# Patient Record
Sex: FEMALE
[De-identification: names, ages, dates, MRNs, and addresses within clinical notes are randomized per-mention and may not be internally consistent; named-entity substitution may affect disease eponyms.]

---

## 2021-01-26 ENCOUNTER — APPOINTMENT (OUTPATIENT)
Dept: DERMATOLOGY | Facility: CLINIC | Age: 56
End: 2021-01-26
Payer: MEDICAID

## 2021-01-26 PROCEDURE — 99204 OFFICE O/P NEW MOD 45 MIN: CPT | Mod: 25

## 2021-01-26 PROCEDURE — 99072 ADDL SUPL MATRL&STAF TM PHE: CPT

## 2021-01-26 PROCEDURE — 11900 INJECT SKIN LESIONS </W 7: CPT

## 2022-01-04 ENCOUNTER — TRANSCRIPTION ENCOUNTER (OUTPATIENT)
Age: 57
End: 2022-01-04

## 2022-11-09 ENCOUNTER — OFFICE (OUTPATIENT)
Dept: URBAN - METROPOLITAN AREA CLINIC 94 | Facility: CLINIC | Age: 57
Setting detail: OPHTHALMOLOGY
End: 2022-11-09
Payer: MEDICAID

## 2022-11-09 DIAGNOSIS — H35.033: ICD-10-CM

## 2022-11-09 DIAGNOSIS — H16.223: ICD-10-CM

## 2022-11-09 DIAGNOSIS — E11.9: ICD-10-CM

## 2022-11-09 DIAGNOSIS — H25.13: ICD-10-CM

## 2022-11-09 PROCEDURE — 92014 COMPRE OPH EXAM EST PT 1/>: CPT | Performed by: OPHTHALMOLOGY

## 2022-11-09 PROCEDURE — 92250 FUNDUS PHOTOGRAPHY W/I&R: CPT | Performed by: OPHTHALMOLOGY

## 2022-11-09 ASSESSMENT — KERATOMETRY
METHOD_AUTO_MANUAL: AUTO
OD_K1POWER_DIOPTERS: 45.50
OS_AXISANGLE_DEGREES: 104
OS_K2POWER_DIOPTERS: 46.00
OD_K2POWER_DIOPTERS: 46.00
OD_AXISANGLE_DEGREES: 084
OS_K1POWER_DIOPTERS: 45.50

## 2022-11-09 ASSESSMENT — AXIALLENGTH_DERIVED
OD_AL: 22.4796
OS_AL: 22.5684
OS_AL: 22.7934

## 2022-11-09 ASSESSMENT — REFRACTION_AUTOREFRACTION
OS_SPHERE: +0.75
OS_AXIS: 152
OD_CYLINDER: -0.25
OS_CYLINDER: -0.25
OD_SPHERE: +1.00
OD_AXIS: 115

## 2022-11-09 ASSESSMENT — VISUAL ACUITY
OD_BCVA: 20/25-
OS_BCVA: 20/30

## 2022-11-09 ASSESSMENT — REFRACTION_MANIFEST
OD_VA1: 20/20
OS_CYLINDER: -0.50
OS_VA1: 20/20
OD_SPHERE: PL
OS_SPHERE: +0.25
OD_ADD: +1.50
OS_AXIS: 180
OS_ADD: +1.50

## 2022-11-09 ASSESSMENT — SUPERFICIAL PUNCTATE KERATITIS (SPK)
OS_SPK: T 1+
OD_SPK: T 1+

## 2022-11-09 ASSESSMENT — SPHEQUIV_DERIVED
OS_SPHEQUIV: 0
OD_SPHEQUIV: 0.875
OS_SPHEQUIV: 0.625

## 2022-11-09 ASSESSMENT — TONOMETRY
OS_IOP_MMHG: 16
OD_IOP_MMHG: 14

## 2022-12-07 PROBLEM — H25.13 CATARACT SENILE NUCLEAR SCLEROSIS; BOTH EYES: Status: ACTIVE | Noted: 2022-11-09

## 2022-12-07 PROBLEM — E11.9 DIABETES TYPE 2 NO RETINOPATHY; RIGHT MILD WITHOUT ME, LEFT MILD WITHOUT ME: Status: ACTIVE | Noted: 2022-11-09

## 2022-12-07 PROBLEM — H16.223 DRY EYE SYNDROME K SICCA; BOTH EYES: Status: ACTIVE | Noted: 2022-11-09

## 2023-01-19 ENCOUNTER — OFFICE (OUTPATIENT)
Dept: URBAN - METROPOLITAN AREA CLINIC 94 | Facility: CLINIC | Age: 58
Setting detail: OPHTHALMOLOGY
End: 2023-01-19
Payer: MEDICAID

## 2023-01-19 DIAGNOSIS — H01.002: ICD-10-CM

## 2023-01-19 DIAGNOSIS — E11.9: ICD-10-CM

## 2023-01-19 DIAGNOSIS — H25.12: ICD-10-CM

## 2023-01-19 DIAGNOSIS — H35.033: ICD-10-CM

## 2023-01-19 DIAGNOSIS — H25.13: ICD-10-CM

## 2023-01-19 PROBLEM — H25.11 CATARACT SENILE NUCLEAR SCLEROSIS; RIGHT EYE, LEFT EYE, BOTH EYES: Status: ACTIVE | Noted: 2023-01-19

## 2023-01-19 PROBLEM — H01.005 BLEPHARITIS; RIGHT LOWER LID, LEFT LOWER LID: Status: ACTIVE | Noted: 2023-01-19

## 2023-01-19 PROCEDURE — 99214 OFFICE O/P EST MOD 30 MIN: CPT | Performed by: OPHTHALMOLOGY

## 2023-01-19 PROCEDURE — 92136 OPHTHALMIC BIOMETRY: CPT | Performed by: OPHTHALMOLOGY

## 2023-01-19 ASSESSMENT — REFRACTION_MANIFEST
OS_SPHERE: +1.00
OS_ADD: +1.50
OS_CYLINDER: -0.25
OD_AXIS: 090
OD_SPHERE: +1.00
OD_VA1: 20/60
OS_AXIS: 140
OS_VA1: 20/40
OD_CYLINDER: -0.25
OD_ADD: +1.50

## 2023-01-19 ASSESSMENT — AXIALLENGTH_DERIVED
OD_AL: 22.4796
OS_AL: 22.4796
OD_AL: 22.4796
OS_AL: 22.4796

## 2023-01-19 ASSESSMENT — KERATOMETRY
OD_CYLPOWER_DEGREES: 0.5
OS_CYLPOWER_DEGREES: 0.5
OS_AXISANGLE_DEGREES: 099
OD_K1POWER_DIOPTERS: 45.50
OD_CYLAXISANGLE_DEGREES: 74
OS_K2POWER_DIOPTERS: 46.00
OD_K1POWER_DIOPTERS: 45.50
OD_K1K2_AVERAGE: 45.75
OD_K2POWER_DIOPTERS: 46.00
OS_K1K2_AVERAGE: 45.75
OS_K2POWER_DIOPTERS: 46.00
OS_K1POWER_DIOPTERS: 45.50
OS_AXISANGLE_DEGREES: 099
OS_AXISANGLE2_DEGREES: 099
OD_AXISANGLE2_DEGREES: 074
OD_AXISANGLE_DEGREES: 074
OD_K2POWER_DIOPTERS: 46.00
METHOD_AUTO_MANUAL: AUTO
OD_AXISANGLE_DEGREES: 074
OS_CYLAXISANGLE_DEGREES: 99
OS_K1POWER_DIOPTERS: 45.50

## 2023-01-19 ASSESSMENT — REFRACTION_AUTOREFRACTION
OD_CYLINDER: -0.25
OS_AXIS: 139
OD_AXIS: 092
OD_SPHERE: +1.00
OS_CYLINDER: -0.25
OS_SPHERE: +1.00

## 2023-01-19 ASSESSMENT — VISUAL ACUITY
OS_BCVA: 20/60
OD_BCVA: 20/40

## 2023-01-19 ASSESSMENT — SPHEQUIV_DERIVED
OD_SPHEQUIV: 0.875
OS_SPHEQUIV: 0.875
OD_SPHEQUIV: 0.875
OS_SPHEQUIV: 0.875

## 2023-01-19 ASSESSMENT — CONFRONTATIONAL VISUAL FIELD TEST (CVF)
OD_FINDINGS: FULL
OS_FINDINGS: FULL

## 2023-01-19 ASSESSMENT — TONOMETRY
OD_IOP_MMHG: 16
OS_IOP_MMHG: 17

## 2023-01-19 ASSESSMENT — LID EXAM ASSESSMENTS
OS_BLEPHARITIS: LLL 1+
OD_BLEPHARITIS: RLL 1+

## 2023-01-19 ASSESSMENT — SUPERFICIAL PUNCTATE KERATITIS (SPK)
OD_SPK: T 1+
OS_SPK: T 1+

## 2023-02-13 ENCOUNTER — OFFICE (OUTPATIENT)
Dept: URBAN - METROPOLITAN AREA CLINIC 94 | Facility: CLINIC | Age: 58
Setting detail: OPHTHALMOLOGY
End: 2023-02-13
Payer: MEDICAID

## 2023-02-13 DIAGNOSIS — Z20.822: ICD-10-CM

## 2023-02-13 DIAGNOSIS — Z01.812: ICD-10-CM

## 2023-02-13 PROCEDURE — 99211 OFF/OP EST MAY X REQ PHY/QHP: CPT | Performed by: OPHTHALMOLOGY

## 2023-02-14 ASSESSMENT — REFRACTION_MANIFEST
OS_ADD: +1.50
OD_VA1: 20/60
OS_AXIS: 140
OD_ADD: +1.50
OD_SPHERE: +1.00
OS_CYLINDER: -0.25
OS_VA1: 20/40
OD_AXIS: 090
OS_SPHERE: +1.00
OD_CYLINDER: -0.25

## 2023-02-14 ASSESSMENT — KERATOMETRY
OS_AXISANGLE_DEGREES: 099
OD_AXISANGLE_DEGREES: 074
OS_K1POWER_DIOPTERS: 45.50
OD_K2POWER_DIOPTERS: 46.00
METHOD_AUTO_MANUAL: AUTO
OD_K1POWER_DIOPTERS: 45.50
OS_K2POWER_DIOPTERS: 46.00

## 2023-02-14 ASSESSMENT — SUPERFICIAL PUNCTATE KERATITIS (SPK)
OS_SPK: T 1+
OD_SPK: T 1+

## 2023-02-14 ASSESSMENT — SPHEQUIV_DERIVED
OD_SPHEQUIV: 0.875
OD_SPHEQUIV: 0.875
OS_SPHEQUIV: 0.875
OS_SPHEQUIV: 0.875

## 2023-02-14 ASSESSMENT — AXIALLENGTH_DERIVED
OS_AL: 22.4796
OS_AL: 22.4796
OD_AL: 22.4796
OD_AL: 22.4796

## 2023-02-14 ASSESSMENT — REFRACTION_AUTOREFRACTION
OD_AXIS: 092
OD_CYLINDER: -0.25
OS_SPHERE: +1.00
OS_AXIS: 139
OD_SPHERE: +1.00
OS_CYLINDER: -0.25

## 2023-02-14 ASSESSMENT — VISUAL ACUITY
OD_BCVA: 20/40
OS_BCVA: 20/60

## 2023-02-16 ENCOUNTER — ASC (OUTPATIENT)
Dept: URBAN - METROPOLITAN AREA SURGERY 8 | Facility: SURGERY | Age: 58
Setting detail: OPHTHALMOLOGY
End: 2023-02-16
Payer: MEDICAID

## 2023-02-16 DIAGNOSIS — H25.12: ICD-10-CM

## 2023-02-16 DIAGNOSIS — H52.212: ICD-10-CM

## 2023-02-16 PROCEDURE — FEMTO CATARACT LASER: Performed by: OPHTHALMOLOGY

## 2023-02-16 PROCEDURE — 66984 XCAPSL CTRC RMVL W/O ECP: CPT | Performed by: OPHTHALMOLOGY

## 2023-02-16 PROCEDURE — V2788P PANOPTIX: Performed by: OPHTHALMOLOGY

## 2023-02-17 ENCOUNTER — RX ONLY (RX ONLY)
Age: 58
End: 2023-02-17

## 2023-02-17 ENCOUNTER — OFFICE (OUTPATIENT)
Dept: URBAN - METROPOLITAN AREA CLINIC 116 | Facility: CLINIC | Age: 58
Setting detail: OPHTHALMOLOGY
End: 2023-02-17
Payer: MEDICAID

## 2023-02-17 DIAGNOSIS — Z96.1: ICD-10-CM

## 2023-02-17 PROCEDURE — 99024 POSTOP FOLLOW-UP VISIT: CPT | Performed by: OPTOMETRIST

## 2023-02-17 ASSESSMENT — REFRACTION_MANIFEST
OD_SPHERE: +1.00
OS_ADD: +1.50
OD_CYLINDER: -0.25
OD_AXIS: 090
OS_AXIS: 140
OD_VA1: 20/60
OS_CYLINDER: -0.25
OS_SPHERE: +1.00
OS_VA1: 20/40
OD_ADD: +1.50

## 2023-02-17 ASSESSMENT — SPHEQUIV_DERIVED
OS_SPHEQUIV: 0.25
OD_SPHEQUIV: 0.875
OS_SPHEQUIV: 0.875
OD_SPHEQUIV: 0.875

## 2023-02-17 ASSESSMENT — KERATOMETRY
OS_AXISANGLE_DEGREES: 165
OS_K2POWER_DIOPTERS: 45.75
OD_K1POWER_DIOPTERS: 45.50
OD_K2POWER_DIOPTERS: 45.75
OS_K1POWER_DIOPTERS: 45.25
METHOD_AUTO_MANUAL: AUTO
OD_AXISANGLE_DEGREES: 075

## 2023-02-17 ASSESSMENT — AXIALLENGTH_DERIVED
OS_AL: 22.7882
OD_AL: 22.5214
OD_AL: 22.5214
OS_AL: 22.5633

## 2023-02-17 ASSESSMENT — REFRACTION_AUTOREFRACTION
OS_CYLINDER: 0.00
OD_CYLINDER: -0.25
OS_SPHERE: +0.25
OS_AXIS: 000
OD_SPHERE: +1.00
OD_AXIS: 120

## 2023-02-17 ASSESSMENT — SUPERFICIAL PUNCTATE KERATITIS (SPK)
OD_SPK: T 1+
OS_SPK: T 1+

## 2023-02-17 ASSESSMENT — LID EXAM ASSESSMENTS
OD_BLEPHARITIS: RLL 1+
OS_BLEPHARITIS: LLL 1+

## 2023-02-17 ASSESSMENT — CONFRONTATIONAL VISUAL FIELD TEST (CVF)
OS_FINDINGS: FULL
OD_FINDINGS: FULL

## 2023-02-17 ASSESSMENT — TONOMETRY
OD_IOP_MMHG: 13
OS_IOP_MMHG: 13

## 2023-02-17 ASSESSMENT — VISUAL ACUITY
OS_BCVA: 20/30-
OD_BCVA: 20/25

## 2023-02-28 ENCOUNTER — OFFICE (OUTPATIENT)
Dept: URBAN - METROPOLITAN AREA CLINIC 94 | Facility: CLINIC | Age: 58
Setting detail: OPHTHALMOLOGY
End: 2023-02-28
Payer: MEDICAID

## 2023-02-28 DIAGNOSIS — Z96.1: ICD-10-CM

## 2023-02-28 DIAGNOSIS — H25.11: ICD-10-CM

## 2023-02-28 PROCEDURE — 92136 OPHTHALMIC BIOMETRY: CPT | Performed by: REGISTERED NURSE

## 2023-02-28 PROCEDURE — 99024 POSTOP FOLLOW-UP VISIT: CPT | Performed by: REGISTERED NURSE

## 2023-02-28 ASSESSMENT — REFRACTION_MANIFEST
OD_AXIS: 090
OS_ADD: +1.50
OD_SPHERE: +1.00
OS_SPHERE: +0.50
OS_SPHERE: +1.00
OS_CYLINDER: SPHERE
OD_CYLINDER: -0.25
OS_CYLINDER: -0.25
OS_AXIS: 140
OD_VA1: 20/50
OD_CYLINDER: SPHERE
OD_VA1: 20/60
OD_SPHERE: +1.00
OS_VA1: 20/20
OS_VA1: 20/40
OD_ADD: +1.50

## 2023-02-28 ASSESSMENT — CONFRONTATIONAL VISUAL FIELD TEST (CVF)
OS_FINDINGS: FULL
OD_FINDINGS: FULL

## 2023-02-28 ASSESSMENT — SPHEQUIV_DERIVED
OD_SPHEQUIV: 0.875
OS_SPHEQUIV: 0.375
OS_SPHEQUIV: 0.875
OD_SPHEQUIV: 1.125

## 2023-02-28 ASSESSMENT — REFRACTION_AUTOREFRACTION
OS_CYLINDER: -0.25
OD_CYLINDER: -0.25
OD_AXIS: 112
OS_SPHERE: +0.50
OD_SPHERE: +1.25
OS_AXIS: 062

## 2023-02-28 ASSESSMENT — SUPERFICIAL PUNCTATE KERATITIS (SPK)
OS_SPK: T 1+
OD_SPK: T 1+

## 2023-02-28 ASSESSMENT — LID EXAM ASSESSMENTS
OS_BLEPHARITIS: LLL 1+
OD_BLEPHARITIS: RLL 1+

## 2023-02-28 ASSESSMENT — VISUAL ACUITY
OD_BCVA: 20/25
OS_BCVA: 20/50

## 2023-03-02 ENCOUNTER — ASC (OUTPATIENT)
Dept: URBAN - METROPOLITAN AREA SURGERY 8 | Facility: SURGERY | Age: 58
Setting detail: OPHTHALMOLOGY
End: 2023-03-02
Payer: MEDICAID

## 2023-03-02 DIAGNOSIS — H52.211: ICD-10-CM

## 2023-03-02 DIAGNOSIS — H25.11: ICD-10-CM

## 2023-03-02 PROCEDURE — 66984 XCAPSL CTRC RMVL W/O ECP: CPT | Performed by: OPHTHALMOLOGY

## 2023-03-02 PROCEDURE — FEMTO WITH CATARACT LASER: Performed by: OPHTHALMOLOGY

## 2023-03-02 PROCEDURE — V2788P PANOPTIX: Performed by: OPHTHALMOLOGY

## 2023-03-03 ENCOUNTER — OFFICE (OUTPATIENT)
Dept: URBAN - METROPOLITAN AREA CLINIC 116 | Facility: CLINIC | Age: 58
Setting detail: OPHTHALMOLOGY
End: 2023-03-03
Payer: MEDICAID

## 2023-03-03 DIAGNOSIS — Z96.1: ICD-10-CM

## 2023-03-03 PROBLEM — H25.11 CATARACT SENILE NUCLEAR SCLEROSIS; RIGHT EYE: Status: ACTIVE | Noted: 2023-02-17

## 2023-03-03 PROCEDURE — 99024 POSTOP FOLLOW-UP VISIT: CPT | Performed by: OPTOMETRIST

## 2023-03-03 ASSESSMENT — REFRACTION_MANIFEST
OS_VA1: 20/40
OD_SPHERE: +1.00
OS_ADD: +1.50
OS_AXIS: 140
OS_CYLINDER: SPHERE
OD_VA1: 20/60
OD_ADD: +1.50
OS_CYLINDER: -0.25
OS_VA1: 20/20
OS_SPHERE: +0.50
OD_CYLINDER: SPHERE
OD_CYLINDER: -0.25
OD_SPHERE: +1.00
OS_SPHERE: +1.00
OD_AXIS: 090
OD_VA1: 20/50

## 2023-03-03 ASSESSMENT — KERATOMETRY
OD_AXISANGLE_DEGREES: 115
OS_K1POWER_DIOPTERS: 45.50
OS_AXISANGLE_DEGREES: 120
OS_K2POWER_DIOPTERS: 45.75
OD_K2POWER_DIOPTERS: 46.50
METHOD_AUTO_MANUAL: AUTO
OD_K1POWER_DIOPTERS: 45.25

## 2023-03-03 ASSESSMENT — LID EXAM ASSESSMENTS
OD_BLEPHARITIS: RLL 1+
OS_BLEPHARITIS: LLL 1+

## 2023-03-03 ASSESSMENT — TONOMETRY
OS_IOP_MMHG: 12
OD_IOP_MMHG: 12

## 2023-03-03 ASSESSMENT — REFRACTION_AUTOREFRACTION
OS_CYLINDER: -0.50
OD_AXIS: 020
OD_SPHERE: -0.25
OS_SPHERE: +0.75
OS_AXIS: 080
OD_CYLINDER: -0.50

## 2023-03-03 ASSESSMENT — VISUAL ACUITY
OD_BCVA: 20/25
OS_BCVA: 20/25

## 2023-03-03 ASSESSMENT — CONFRONTATIONAL VISUAL FIELD TEST (CVF)
OD_FINDINGS: FULL
OS_FINDINGS: FULL

## 2023-03-03 ASSESSMENT — AXIALLENGTH_DERIVED
OD_AL: 22.438
OD_AL: 22.9333
OS_AL: 22.5214
OS_AL: 22.6553

## 2023-03-03 ASSESSMENT — SPHEQUIV_DERIVED
OS_SPHEQUIV: 0.5
OS_SPHEQUIV: 0.875
OD_SPHEQUIV: -0.5
OD_SPHEQUIV: 0.875

## 2023-03-03 ASSESSMENT — SUPERFICIAL PUNCTATE KERATITIS (SPK)
OD_SPK: T 1+
OS_SPK: T 1+

## 2023-03-25 ENCOUNTER — OFFICE (OUTPATIENT)
Dept: URBAN - METROPOLITAN AREA CLINIC 94 | Facility: CLINIC | Age: 58
Setting detail: OPHTHALMOLOGY
End: 2023-03-25
Payer: MEDICAID

## 2023-03-25 DIAGNOSIS — Z96.1: ICD-10-CM

## 2023-03-25 PROCEDURE — 99024 POSTOP FOLLOW-UP VISIT: CPT | Performed by: PHYSICIAN ASSISTANT

## 2023-03-25 ASSESSMENT — REFRACTION_MANIFEST
OD_SPHERE: +1.00
OD_SPHERE: +1.00
OS_VA1: 20/20
OS_ADD: +1.50
OD_AXIS: 090
OS_CYLINDER: SPHERE
OS_CYLINDER: -0.25
OS_AXIS: 140
OS_SPHERE: +0.50
OD_ADD: +1.50
OD_VA1: 20/60
OD_VA1: 20/50
OS_SPHERE: +1.00
OS_VA1: 20/40
OD_CYLINDER: SPHERE
OD_CYLINDER: -0.25

## 2023-03-25 ASSESSMENT — AXIALLENGTH_DERIVED
OS_AL: 22.4796
OD_AL: 22.5214
OD_AL: 22.7908
OS_AL: 22.7481

## 2023-03-25 ASSESSMENT — KERATOMETRY
OD_K2POWER_DIOPTERS: 45.75
OS_AXISANGLE_DEGREES: 112
OD_AXISANGLE_DEGREES: 069
OS_K1POWER_DIOPTERS: 45.50
METHOD_AUTO_MANUAL: AUTO
OS_K2POWER_DIOPTERS: 46.00
OD_K1POWER_DIOPTERS: 45.50

## 2023-03-25 ASSESSMENT — REFRACTION_AUTOREFRACTION
OD_CYLINDER: -0.25
OS_SPHERE: +0.25
OD_AXIS: 117
OS_CYLINDER: -0.25
OD_SPHERE: +0.25
OS_AXIS: 048

## 2023-03-25 ASSESSMENT — SUPERFICIAL PUNCTATE KERATITIS (SPK)
OS_SPK: T 1+
OD_SPK: T 1+

## 2023-03-25 ASSESSMENT — SPHEQUIV_DERIVED
OS_SPHEQUIV: 0.125
OD_SPHEQUIV: 0.125
OS_SPHEQUIV: 0.875
OD_SPHEQUIV: 0.875

## 2023-03-25 ASSESSMENT — TONOMETRY
OS_IOP_MMHG: 15
OD_IOP_MMHG: 15

## 2023-03-25 ASSESSMENT — CONFRONTATIONAL VISUAL FIELD TEST (CVF)
OS_FINDINGS: FULL
OD_FINDINGS: FULL

## 2023-03-25 ASSESSMENT — VISUAL ACUITY
OD_BCVA: 20/20
OS_BCVA: 20/20-1

## 2023-07-20 ENCOUNTER — OFFICE (OUTPATIENT)
Dept: URBAN - METROPOLITAN AREA CLINIC 94 | Facility: CLINIC | Age: 58
Setting detail: OPHTHALMOLOGY
End: 2023-07-20
Payer: MEDICAID

## 2023-07-20 ENCOUNTER — RX ONLY (RX ONLY)
Age: 58
End: 2023-07-20

## 2023-07-20 DIAGNOSIS — H26.493: ICD-10-CM

## 2023-07-20 PROCEDURE — 99213 OFFICE O/P EST LOW 20 MIN: CPT | Performed by: OPHTHALMOLOGY

## 2023-07-20 ASSESSMENT — KERATOMETRY
OD_K2POWER_DIOPTERS: 46.00
OD_K1POWER_DIOPTERS: 45.75
METHOD_AUTO_MANUAL: AUTO
OD_AXISANGLE_DEGREES: 047
OS_K2POWER_DIOPTERS: 46.00
OS_K1POWER_DIOPTERS: 45.50
OS_AXISANGLE_DEGREES: 107

## 2023-07-20 ASSESSMENT — CONFRONTATIONAL VISUAL FIELD TEST (CVF)
OD_FINDINGS: FULL
OS_FINDINGS: FULL

## 2023-07-20 ASSESSMENT — SPHEQUIV_DERIVED
OS_SPHEQUIV: 0.25
OD_SPHEQUIV: 0.875
OS_SPHEQUIV: 0.875
OD_SPHEQUIV: -0.25

## 2023-07-20 ASSESSMENT — REFRACTION_MANIFEST
OS_SPHERE: +0.50
OS_VA1: 20/20
OD_ADD: +1.50
OS_ADD: +1.50
OS_VA1: 20/40
OD_SPHERE: +1.00
OD_CYLINDER: -0.25
OD_VA1: 20/60
OD_VA1: 20/50
OS_CYLINDER: SPHERE
OD_CYLINDER: SPHERE
OS_SPHERE: +1.00
OD_AXIS: 090
OD_SPHERE: +1.00
OS_AXIS: 140
OS_CYLINDER: -0.25

## 2023-07-20 ASSESSMENT — REFRACTION_AUTOREFRACTION
OS_CYLINDER: -0.50
OD_SPHERE: 0.00
OS_SPHERE: +0.50
OD_AXIS: 107
OD_CYLINDER: -0.50
OS_AXIS: 079

## 2023-07-20 ASSESSMENT — AXIALLENGTH_DERIVED
OD_AL: 22.438
OS_AL: 22.7029
OD_AL: 22.8416
OS_AL: 22.4796

## 2023-07-20 ASSESSMENT — TONOMETRY
OS_IOP_MMHG: 12
OD_IOP_MMHG: 13

## 2023-07-20 ASSESSMENT — VISUAL ACUITY
OS_BCVA: 20/20
OD_BCVA: 20/20-1

## 2023-07-20 ASSESSMENT — SUPERFICIAL PUNCTATE KERATITIS (SPK)
OD_SPK: T 1+
OS_SPK: T 1+

## 2023-11-16 ENCOUNTER — OFFICE (OUTPATIENT)
Dept: URBAN - METROPOLITAN AREA CLINIC 94 | Facility: CLINIC | Age: 58
Setting detail: OPHTHALMOLOGY
End: 2023-11-16
Payer: MEDICAID

## 2023-11-16 DIAGNOSIS — H26.493: ICD-10-CM

## 2023-11-16 DIAGNOSIS — H16.223: ICD-10-CM

## 2023-11-16 DIAGNOSIS — H35.033: ICD-10-CM

## 2023-11-16 DIAGNOSIS — E11.9: ICD-10-CM

## 2023-11-16 PROCEDURE — 92250 FUNDUS PHOTOGRAPHY W/I&R: CPT | Performed by: OPHTHALMOLOGY

## 2023-11-16 PROCEDURE — 92014 COMPRE OPH EXAM EST PT 1/>: CPT | Performed by: OPHTHALMOLOGY

## 2023-11-16 PROCEDURE — 83861 MICROFLUID ANALY TEARS: CPT | Performed by: OPHTHALMOLOGY

## 2023-11-16 ASSESSMENT — CONFRONTATIONAL VISUAL FIELD TEST (CVF)
OS_FINDINGS: FULL
OD_FINDINGS: FULL

## 2023-11-16 ASSESSMENT — SUPERFICIAL PUNCTATE KERATITIS (SPK)
OS_SPK: T 1+
OD_SPK: T 1+

## 2023-11-17 ASSESSMENT — SPHEQUIV_DERIVED
OS_SPHEQUIV: 0.875
OD_SPHEQUIV: 0.875
OS_SPHEQUIV: 0.25
OD_SPHEQUIV: -0.125

## 2023-11-17 ASSESSMENT — REFRACTION_MANIFEST
OD_VA1: 20/60
OD_AXIS: 090
OS_CYLINDER: -0.25
OS_SPHERE: +1.00
OD_SPHERE: +1.00
OS_SPHERE: +0.50
OD_VA1: 20/50
OD_CYLINDER: -0.25
OS_VA1: 20/40
OS_VA1: 20/20
OD_ADD: +1.50
OS_ADD: +1.50
OD_SPHERE: +1.00
OD_CYLINDER: SPHERE
OS_CYLINDER: SPHERE
OS_AXIS: 140

## 2023-11-17 ASSESSMENT — REFRACTION_AUTOREFRACTION
OS_CYLINDER: -0.50
OD_AXIS: 118
OD_SPHERE: 0.00
OD_CYLINDER: -0.25
OS_AXIS: 094
OS_SPHERE: +0.50

## 2024-05-16 ENCOUNTER — OFFICE (OUTPATIENT)
Dept: URBAN - METROPOLITAN AREA CLINIC 94 | Facility: CLINIC | Age: 59
Setting detail: OPHTHALMOLOGY
End: 2024-05-16
Payer: MEDICAID

## 2024-05-16 DIAGNOSIS — H35.033: ICD-10-CM

## 2024-05-16 DIAGNOSIS — H16.223: ICD-10-CM

## 2024-05-16 DIAGNOSIS — H16.222: ICD-10-CM

## 2024-05-16 PROCEDURE — 99214 OFFICE O/P EST MOD 30 MIN: CPT | Performed by: OPHTHALMOLOGY

## 2024-05-16 PROCEDURE — 83861 MICROFLUID ANALY TEARS: CPT | Performed by: OPHTHALMOLOGY

## 2024-05-16 ASSESSMENT — CONFRONTATIONAL VISUAL FIELD TEST (CVF)
OS_FINDINGS: FULL
OD_FINDINGS: FULL

## 2024-06-07 ENCOUNTER — RX ONLY (RX ONLY)
Age: 59
End: 2024-06-07

## 2024-06-07 ENCOUNTER — OFFICE (OUTPATIENT)
Dept: URBAN - METROPOLITAN AREA CLINIC 94 | Facility: CLINIC | Age: 59
Setting detail: OPHTHALMOLOGY
End: 2024-06-07
Payer: MEDICAID

## 2024-06-07 ENCOUNTER — ASC (OUTPATIENT)
Dept: URBAN - METROPOLITAN AREA SURGERY 8 | Facility: SURGERY | Age: 59
Setting detail: OPHTHALMOLOGY
End: 2024-06-07
Payer: MEDICAID

## 2024-06-07 DIAGNOSIS — E11.9: ICD-10-CM

## 2024-06-07 DIAGNOSIS — H10.45: ICD-10-CM

## 2024-06-07 DIAGNOSIS — H35.033: ICD-10-CM

## 2024-06-07 DIAGNOSIS — H26.491: ICD-10-CM

## 2024-06-07 PROCEDURE — 66821 AFTER CATARACT LASER SURGERY: CPT | Mod: RT | Performed by: OPHTHALMOLOGY

## 2024-06-07 PROCEDURE — 92134 CPTRZ OPH DX IMG PST SGM RTA: CPT | Performed by: OPHTHALMOLOGY

## 2024-06-07 PROCEDURE — 92012 INTRM OPH EXAM EST PATIENT: CPT | Mod: 57 | Performed by: OPHTHALMOLOGY

## 2024-06-07 ASSESSMENT — CONFRONTATIONAL VISUAL FIELD TEST (CVF)
OS_FINDINGS: FULL
OD_FINDINGS: FULL

## 2024-06-10 ENCOUNTER — OFFICE (OUTPATIENT)
Dept: URBAN - METROPOLITAN AREA CLINIC 112 | Facility: CLINIC | Age: 59
Setting detail: OPHTHALMOLOGY
End: 2024-06-10
Payer: MEDICAID

## 2024-06-10 DIAGNOSIS — H16.223: ICD-10-CM

## 2024-06-10 PROCEDURE — 99213 OFFICE O/P EST LOW 20 MIN: CPT | Mod: 24 | Performed by: REGISTERED NURSE

## 2024-06-10 ASSESSMENT — CONFRONTATIONAL VISUAL FIELD TEST (CVF)
OS_FINDINGS: FULL
OD_FINDINGS: FULL

## 2024-06-20 ENCOUNTER — ASC (OUTPATIENT)
Dept: URBAN - METROPOLITAN AREA SURGERY 8 | Facility: SURGERY | Age: 59
Setting detail: OPHTHALMOLOGY
End: 2024-06-20
Payer: MEDICAID

## 2024-06-20 DIAGNOSIS — H35.033: ICD-10-CM

## 2024-06-20 DIAGNOSIS — H16.223: ICD-10-CM

## 2024-06-20 DIAGNOSIS — H26.492: ICD-10-CM

## 2024-06-20 DIAGNOSIS — H10.45: ICD-10-CM

## 2024-06-20 PROBLEM — H26.493 POSTERIOR CAPSULAR OPACIFICATION; RIGHT EYE, LEFT EYE, BOTH EYES: Status: ACTIVE | Noted: 2024-06-10

## 2024-06-20 PROBLEM — H26.491 POSTERIOR CAPSULAR OPACIFICATION; RIGHT EYE, LEFT EYE, BOTH EYES: Status: ACTIVE | Noted: 2024-06-10

## 2024-06-20 PROCEDURE — 66821 AFTER CATARACT LASER SURGERY: CPT | Mod: 79,LT | Performed by: OPHTHALMOLOGY

## 2024-06-20 PROCEDURE — 99213 OFFICE O/P EST LOW 20 MIN: CPT | Mod: 24,57 | Performed by: OPHTHALMOLOGY

## 2024-06-20 ASSESSMENT — CONFRONTATIONAL VISUAL FIELD TEST (CVF)
OD_FINDINGS: FULL
OS_FINDINGS: FULL

## 2024-06-21 ENCOUNTER — RX ONLY (RX ONLY)
Age: 59
End: 2024-06-21

## 2024-08-22 ENCOUNTER — OFFICE (OUTPATIENT)
Dept: URBAN - METROPOLITAN AREA CLINIC 112 | Facility: CLINIC | Age: 59
Setting detail: OPHTHALMOLOGY
End: 2024-08-22
Payer: MEDICAID

## 2024-08-22 DIAGNOSIS — H35.033: ICD-10-CM

## 2024-08-22 DIAGNOSIS — H10.45: ICD-10-CM

## 2024-08-22 DIAGNOSIS — E11.9: ICD-10-CM

## 2024-08-22 DIAGNOSIS — H16.223: ICD-10-CM

## 2024-08-22 PROCEDURE — 83861 MICROFLUID ANALY TEARS: CPT | Performed by: OPHTHALMOLOGY

## 2024-08-22 PROCEDURE — 99212 OFFICE O/P EST SF 10 MIN: CPT | Performed by: OPHTHALMOLOGY

## 2024-08-22 ASSESSMENT — CONFRONTATIONAL VISUAL FIELD TEST (CVF)
OS_FINDINGS: FULL
OD_FINDINGS: FULL

## 2024-09-16 ENCOUNTER — APPOINTMENT (OUTPATIENT)
Age: 59
End: 2024-09-16
Payer: COMMERCIAL

## 2024-09-16 VITALS — BODY MASS INDEX: 26.5 KG/M2 | HEIGHT: 60 IN | WEIGHT: 135 LBS

## 2024-09-16 DIAGNOSIS — M79.671 PAIN IN RIGHT FOOT: ICD-10-CM

## 2024-09-16 DIAGNOSIS — M21.6X1 OTHER ACQUIRED DEFORMITIES OF RIGHT FOOT: ICD-10-CM

## 2024-09-16 DIAGNOSIS — M72.2 PLANTAR FASCIAL FIBROMATOSIS: ICD-10-CM

## 2024-09-16 DIAGNOSIS — M21.6X2 OTHER ACQUIRED DEFORMITIES OF LEFT FOOT: ICD-10-CM

## 2024-09-16 DIAGNOSIS — Z78.9 OTHER SPECIFIED HEALTH STATUS: ICD-10-CM

## 2024-09-16 DIAGNOSIS — M79.672 PAIN IN LEFT FOOT: ICD-10-CM

## 2024-09-16 PROBLEM — Z00.00 ENCOUNTER FOR PREVENTIVE HEALTH EXAMINATION: Status: ACTIVE | Noted: 2024-09-16

## 2024-09-16 PROCEDURE — 99204 OFFICE O/P NEW MOD 45 MIN: CPT

## 2024-09-16 PROCEDURE — 73620 X-RAY EXAM OF FOOT: CPT | Mod: LT

## 2024-09-16 RX ORDER — CELECOXIB 200 MG/1
200 CAPSULE ORAL TWICE DAILY
Qty: 60 | Refills: 0 | Status: ACTIVE | COMMUNITY
Start: 2024-09-16 | End: 1900-01-01

## 2024-09-16 NOTE — ASSESSMENT
[FreeTextEntry1] : Discussed diagnosis and treatment with patient Discussed etiology of symptoms patient is experiencing Obtained/reviewed left foot xrays 2 views WB 9/16/24: unable to obtain lateral view due to transient malfunctioning. Incidental finding of medial 1st met enthesopathy. No fracture or dislocation Demonstrated proper stretching techniques with patient showing understanding Discussed proper RICE techniques to reduce inflammation and for pain control Discussed specific examples of over-the-counter inserts to control heel eversion and support the medial arch Discussed proper shoes (stiff heel counter and midsole with flexion at the 1st MTPJ) Rx: Celecoxib PO BID for 2 weeks, then as needed Discussed all risks, complications, and benefits of corticosteroid injection therapy.  Will have further discussion at next visit    Patient to return to the office in 3-4 weeks

## 2024-09-16 NOTE — REASON FOR VISIT
[Initial Visit] : an initial visit for [Plantar Fasciitis] : plantar fasciitis [FreeTextEntry2] : b/l heel pain, left worse

## 2024-09-16 NOTE — PHYSICAL EXAM
[General Appearance - Alert] : alert [General Appearance - In No Acute Distress] : in no acute distress [Skin Color & Pigmentation] : normal skin color and pigmentation [Skin Turgor] : normal skin turgor [Skin Lesions] : no skin lesions [Sensation] : the sensory exam was normal to light touch and pinprick [No Focal Deficits] : no focal deficits [Deep Tendon Reflexes (DTR)] : deep tendon reflexes were 2+ and symmetric [Motor Exam] : the motor exam was normal [Oriented To Time, Place, And Person] : oriented to person, place, and time [Impaired Insight] : insight and judgment were intact [Affect] : the affect was normal [Ankle Swelling (On Exam)] : not present [Varicose Veins Of Lower Extremities] : not present [] : not present [Delayed in the Right Toes] : capillary refills normal in right toes [Delayed in the Left Toes] : capillary refills normal in the left toes [de-identified] : Pain on palpation at medial calcaneal tuberosity of Right < Left.   Increased pain with the windless mechanism.  Flexible mid/hindfoot deformity noted foot passively corrected to plantigrade foot position Right Left.  Decreased range of motion in dorsiflexion Right Left. Standing Exam: Decrease in medial longitudinal arch Right and Left. Hindfoot valgus noted Right and Left.  Forefoot abduction 'too many toes' sign Right and Left  Muscle strength 5/5 all major muscle groups bilateral.  [Foot Ulcer] : no foot ulcer [Skin Induration] : no skin induration

## 2024-09-16 NOTE — HISTORY OF PRESENT ILLNESS
[FreeTextEntry1] : Patient complains of b/l heel pain states she experiences cramps, swelling and sharp pain on both more on left. That began about 6 months ago.  Patient has hx of wart removal in the past, believes she has a wart on her right great toe due to having pain. Has tried topical pain and otc oral NSAIDs without relief.

## 2024-10-18 ENCOUNTER — RX RENEWAL (OUTPATIENT)
Age: 59
End: 2024-10-18

## 2024-10-23 ENCOUNTER — APPOINTMENT (OUTPATIENT)
Age: 59
End: 2024-10-23
Payer: COMMERCIAL

## 2024-10-23 DIAGNOSIS — M21.42 FLAT FOOT [PES PLANUS] (ACQUIRED), LEFT FOOT: ICD-10-CM

## 2024-10-23 DIAGNOSIS — M72.2 PLANTAR FASCIAL FIBROMATOSIS: ICD-10-CM

## 2024-10-23 DIAGNOSIS — M79.671 PAIN IN RIGHT FOOT: ICD-10-CM

## 2024-10-23 DIAGNOSIS — M79.672 PAIN IN LEFT FOOT: ICD-10-CM

## 2024-10-23 DIAGNOSIS — M21.6X2 OTHER ACQUIRED DEFORMITIES OF LEFT FOOT: ICD-10-CM

## 2024-10-23 DIAGNOSIS — M21.6X1 OTHER ACQUIRED DEFORMITIES OF RIGHT FOOT: ICD-10-CM

## 2024-10-23 PROCEDURE — 99214 OFFICE O/P EST MOD 30 MIN: CPT | Mod: 25

## 2024-10-23 PROCEDURE — 20550 NJX 1 TENDON SHEATH/LIGAMENT: CPT | Mod: LT

## 2024-11-13 ENCOUNTER — APPOINTMENT (OUTPATIENT)
Age: 59
End: 2024-11-13
Payer: COMMERCIAL

## 2024-11-13 DIAGNOSIS — M79.671 PAIN IN RIGHT FOOT: ICD-10-CM

## 2024-11-13 DIAGNOSIS — M21.6X2 OTHER ACQUIRED DEFORMITIES OF LEFT FOOT: ICD-10-CM

## 2024-11-13 DIAGNOSIS — M21.42 FLAT FOOT [PES PLANUS] (ACQUIRED), LEFT FOOT: ICD-10-CM

## 2024-11-13 DIAGNOSIS — M72.2 PLANTAR FASCIAL FIBROMATOSIS: ICD-10-CM

## 2024-11-13 DIAGNOSIS — M21.6X1 OTHER ACQUIRED DEFORMITIES OF RIGHT FOOT: ICD-10-CM

## 2024-11-13 DIAGNOSIS — M79.672 PAIN IN LEFT FOOT: ICD-10-CM

## 2024-11-13 PROCEDURE — 99214 OFFICE O/P EST MOD 30 MIN: CPT | Mod: 25

## 2024-11-13 PROCEDURE — 20550 NJX 1 TENDON SHEATH/LIGAMENT: CPT | Mod: LT

## 2024-12-11 ENCOUNTER — APPOINTMENT (OUTPATIENT)
Age: 59
End: 2024-12-11

## 2024-12-20 ENCOUNTER — RX RENEWAL (OUTPATIENT)
Age: 59
End: 2024-12-20